# Patient Record
Sex: FEMALE | Race: BLACK OR AFRICAN AMERICAN
[De-identification: names, ages, dates, MRNs, and addresses within clinical notes are randomized per-mention and may not be internally consistent; named-entity substitution may affect disease eponyms.]

---

## 2019-06-24 NOTE — NUR
ASSESMENT COMPLETED. VSS. A/O. DENIES PAIN. SOA W/ EXERTION. NOTED GENERALIZED
EDEMA. PLANS FOR TEMPORARY DIALYSIS CATH PLACEMENT IN AM. PATINO PLACED. PT
RESTING IN BED WILL CONT. TO MONITOR. BED ALARM ON.

## 2019-06-24 NOTE — NUR
ASSESSMENT-PT WAS RECENTLY AT [facility] THEN WAS DISCHARGED TO [facility]
FOR REHAB.  PT SENT HERE TO INITIATE HEMODIALYSIS.  PT SAYS SHE WAS TOLD IN
FEB. THAT SHE NEEDED TO START HEMODIALYSIS AND PT THOUGHT SHE WOULD BE ABLE TO
TO GET BY FROM A MEDICAL STANDPOINT BUT IS FAILING.  PRIOR PT HAD BEEN LIVING
AT HOME AND HER NIECE WAS STAYING WITH HER.  PT HAS BEEEN USING A WALKER FOR
ABOUT 3 MONTHS.  NIECE HELPING HER WITH HER ADLS, MEALS, AND LAUNDRY.  PT HAD
BEEN DOING HER CLEANING.  FAMILY PROVIDES TRANSPORTATION.  PT HAS 3 SISTERS IN
THE AREA.  PT SAYS SHE HAD HH SERVICES IN FEB. BUT DOES NOT KNOW THE NAME OF
HE AGENCY THAT CAME OUT.  PT CURRENTLY SHORT OF BREATH AND HER 2 SISTERS
HELPED PROVIDE ASSESSMENT INFO.  FOLLOWING TO ASSIST WITH DC PLANNING.

## 2019-06-25 NOTE — NUR
Nutrition: screen for dx new HD pt.  Dialysis catheter placed in IR and pt
receiving HD this am, has been NPO.  No wt hx, note of swollen face in
physician assessement and expect wt up with fluid gain.  Lasix and other meds
reviewed.  K+ 5.4, Cl 108, BUN 57, Cr 5.3, Phos 7.6, albumin 1.6.  No acute
nutrition concerns noted at this time, does not appear at significant risk.
Rec renal diet; RD available via consult for edu.

## 2019-06-25 NOTE — NUR
FAXED FACE SHEET, H&P, RENAL CONSULT, DIALYSIS FLOW SHEET,CHEMISTRIES, CBC,
COAGS, UA, CXR AND LINE PLACEMENT INFO TO ELVIA GONZALEZ TO GET PT SET-UP FOR
OUTPT HEMODIALYSIS.

## 2019-06-25 NOTE — NUR
ASSESSMENT COMPLETED.LASIX ADMINISTRED AS ORDERED.PATINO CATH TO DD.PT
REPOSITIONED WHILE IN BED PER HER REQUEST.PT NPO AT THIS TIME FOR A PROCEDURE
IN THE AM.FALL PRECAUTIONS IN PLACE. CALL LIGHT WITHIN REACH.

## 2019-06-25 NOTE — NUR
RECEIVED PT CARE APPROX 0700. A/O. DENIES PAIN. SOA W/ EXERTION. NO NV.
DIALYSIS CATH PLACED IN IR TODAY. PT IN DIALYSIS AT THIS TIME- TOLERATING
WELL. WILL CONT. TO MONITOR.

## 2019-06-26 NOTE — NUR
FAXED ADDITIONAL LABS TO [facility] AND CONFIRMED THAT THEY HAVE RECEIVED THE
INFO.  UNIT MANAGER WILL CALL ME TOMORROW.  FOLLOWING.

## 2019-06-26 NOTE — NUR
FAXED REFERRAL TO [facility] FOR SKILLED STAY LEFT MSG WITH HARITHA IN ADM
THAT PT TO DC SATURDAY 6/29. DCP TO FOLLOW.

## 2019-06-26 NOTE — NUR
Assessment completed.vss.Pt up in chair since 10am till  1400.Dr Monahan and Shai
here,order noted.Pt family called and updates given.Pt left for hemodialysis.
[facility] cath will be dc whenever pt completed treatment today.Pt c/o cold room
and warm blanket given.Will continue to monitor.

## 2019-06-26 NOTE — NUR
PT'S FAMILY AT BEDSIDE AT SHIFT CHANGE.PT WAS OBSERVED SITTING UP IN THE
RECLINER IN HER ROOM WATCHING TV.PT STILL EDEMATOUS BUT BETTER THAN THE
PREVIUOS DAY.PT REPOSITIONED PER HER REQUEST WHILE IN BED.DIALYSIS SITE TO HER
R CHEST.PT DENIED PAIN SO FAR.PATINO CATH TO DD.PT PLEASANT AND COOPERATIVE
WITH CARE.PT ABLR TO MAKE HER NEEDS KNOWN.FALL PRECAUTIONS IN PLACE,CALL LIGHT
WITHIN REACH.

## 2019-06-27 NOTE — NUR
Assumed pt care at 7am.Pt in chair receiving hemodialysis before the start of
shift.Blood sugar was 42 early this am,no D50 available per pharmacy but apple
juice 240ml given. 15minutes later blood sugar was increased to 74.Dr Monahan and
Shai here,order noted.Per dialysis personnel report,3l of fluid was taken off
today.Pt will be dialyzing in am prior to dc to rehab.Will continue to
monitor.

## 2019-06-27 NOTE — NUR
ASSESSMENT COMPLETED. PT IS VERY PLEASANT. OBSERVED SITTING IN CHAIR. DENIES
PAIN. EDEMA TO BLE UP TO THE THIGHS. PT HS BLOOD SUGAR WAS 57. GIVEN SOME
APPLEJUICE AND SOME CRACKERS AND RECHECK WAS 82. DENIES SOA. IN A JOVIAL
MOOD. WITH SOME STRESS INCONTINENCE.REPORT GIVEN TO KENA NORTON AT AROUND 2300
HRS.

## 2019-06-27 NOTE — NUR
Assumed pt care at 1900. Pt A/OX4,denies pain on assessment. Up with assist of
1/rw to [facility], pt noted to have bladder incontinence x3 during NOC stating she's
not able to hold it. Pt got dialyzed yesterday 2.5L taken out and pt is to
dialyze again this morning. Has a Right chest tesio. Up resting on the chair
at this time.

## 2019-06-27 NOTE — NUR
Dc planning efforts discussed with the pt at bedside. She wants to go home
with hh and outpt dialysis vs going back to SNF at [facility]. She feels she is
getting stronger and she has the support of her niece and her sisters. She
reports her sister can take her to dialysis 3 xwkly. Pt advised that her outpt
schedule is being arranged in the  [facility] for T-TH-Sat and shift
time is 1200 noon with an arrival of 11:15am. She has had hh before and found
their services helpful. She can not recall the agency name and wants someone
who is in network with her ins.Listing declined and pt denies preference.
[facility] notified of referral as they go to [place] and accept her plan. Possible
dc tomorrow.  Pt to update her family. She has a rwalker and cane for home
use. DC planner to cancel referral with [facility].

## 2019-06-28 NOTE — NUR
DISCHARGE PAPERS GONE OVER WITH PATIENT SIGNED AND COPY IN CHART. IV ACSESS
DCD. RX'S GIVEN. ALL BELONGINGS PACKED AND SENT WITH PATIENT, SISTER TO COME
AND PICK HER UP. PT TO HAVE DIAYLSIS TOMMORROW AT 11:15

## 2019-06-28 NOTE — NUR
RECEIVED CALL FROM SHO FROM [facility] SAYING THEY WOULD PREFER PT STARTED ON TUES
INSTEAD OF SAT.  INFORMED DR NORMAN AND HE S/W WVI AND HE STILL WANTS PT TO
START ON SAT AS PLANNED.

## 2019-06-28 NOTE — NUR
[facility]ASON AND SHE
WILL NOTIFY PT TIME OF VISITS.

## 2020-08-05 NOTE — EKG
[facility]
[address]
[place]   [address]                     ELECTROCARDIOGRAM REPORT      
_______________________________________________________________________________
 
Name:       MANUEL LEON              [facility]                     [id number]                       Account #:      [id number]  
Admission:  20    Attend Phys:                          
Discharge:  20    Date of Birth:  [birth year]  
                                                          Report #: [id number]
                                                                    [id number]
_______________________________________________________________________________
THIS REPORT FOR:  
 
cc:  Melanie Romero MD, Karla L. MD Lundgren,Brody GOLDMAN MD [facility]                                        ~
THIS REPORT FOR:   //name//                          
 
                         [facility] ED
                                       
Test Date:    2020               Test Time:    16:56:41
Pat Name:     MANUEL LEON             Department:   
Patient ID:   [id number]            Room:          
Gender:       F                        Technician:   
:          1950               Requested By: Reny Starkey
Order Number: [id number] MD:   Brody Maloney
                                 Measurements
Intervals                              Axis          
Rate:         77                       P:            14
WA:           112                      QRS:          -2
QRSD:         78                       T:            13
QT:           427                                    
QTc:          484                                    
                           Interpretive Statements
Sinus rhythm
Left ventricular hypertrophy
Poor R wave progression
Cannot rule out inferior infarct, age indeterminate
Compared to ECG 2016 08:02:15
Ventricular premature complex(es) no longer present
Electronically Signed On 2020 8:31:08 CDT by Brody Maloney
[email or web address]
 
 
 
 
 
 
 
 
 
 
 
 
 
  <ELECTRONICALLY SIGNED>
   By: Brody Maloney MD, [facility]   
  20     0831
D: 20 1656                           _____________________________________
T: 20 1656                           Brody Maloney MD, [facility]     /EPI

## 2021-04-12 ENCOUNTER — HOSPITAL ENCOUNTER (OUTPATIENT)
Dept: HOSPITAL 35 - ER | Age: 71
Setting detail: OBSERVATION
LOS: 2 days | Discharge: NURSING, REHAB OR LONG-TERM CARE | End: 2021-04-14
Attending: HOSPITALIST | Admitting: HOSPITALIST
Payer: COMMERCIAL

## 2021-04-12 VITALS — DIASTOLIC BLOOD PRESSURE: 64 MMHG | SYSTOLIC BLOOD PRESSURE: 136 MMHG

## 2021-04-12 VITALS — BODY MASS INDEX: 20.9 KG/M2 | WEIGHT: 122.4 LBS | HEIGHT: 64.02 IN

## 2021-04-12 DIAGNOSIS — R74.8: ICD-10-CM

## 2021-04-12 DIAGNOSIS — I13.2: Primary | ICD-10-CM

## 2021-04-12 DIAGNOSIS — F32.9: ICD-10-CM

## 2021-04-12 DIAGNOSIS — Z79.4: ICD-10-CM

## 2021-04-12 DIAGNOSIS — I50.33: ICD-10-CM

## 2021-04-12 DIAGNOSIS — E11.65: ICD-10-CM

## 2021-04-12 DIAGNOSIS — N18.6: ICD-10-CM

## 2021-04-12 DIAGNOSIS — E78.5: ICD-10-CM

## 2021-04-12 DIAGNOSIS — I63.9: ICD-10-CM

## 2021-04-12 DIAGNOSIS — Z79.82: ICD-10-CM

## 2021-04-12 DIAGNOSIS — Z99.2: ICD-10-CM

## 2021-04-12 DIAGNOSIS — Z79.899: ICD-10-CM

## 2021-04-12 DIAGNOSIS — E11.22: ICD-10-CM

## 2021-04-12 LAB
ALBUMIN SERPL-MCNC: 2.8 G/DL (ref 3.4–5)
ALT SERPL-CCNC: 14 U/L (ref 14–59)
ANION GAP SERPL CALC-SCNC: 10 MMOL/L (ref 7–16)
AST SERPL-CCNC: 25 U/L (ref 15–37)
BASOPHILS NFR BLD AUTO: 0.8 % (ref 0–2)
BILIRUB SERPL-MCNC: 0.2 MG/DL (ref 0.2–1)
BUN SERPL-MCNC: 31 MG/DL (ref 7–18)
CALCIUM SERPL-MCNC: 8.5 MG/DL (ref 8.5–10.1)
CHLORIDE SERPL-SCNC: 97 MMOL/L (ref 98–107)
CO2 SERPL-SCNC: 30 MMOL/L (ref 21–32)
CREAT SERPL-MCNC: 5.5 MG/DL (ref 0.6–1)
EOSINOPHIL NFR BLD: 1.7 % (ref 0–3)
ERYTHROCYTE [DISTWIDTH] IN BLOOD BY AUTOMATED COUNT: 18.8 % (ref 10.5–14.5)
GLUCOSE SERPL-MCNC: 287 MG/DL (ref 74–106)
GRANULOCYTES NFR BLD MANUAL: 78.9 % (ref 36–66)
HCT VFR BLD CALC: 31.3 % (ref 37–47)
HGB BLD-MCNC: 9.9 GM/DL (ref 12–15)
LYMPHOCYTES NFR BLD AUTO: 11.4 % (ref 24–44)
MCH RBC QN AUTO: 28.2 PG (ref 26–34)
MCHC RBC AUTO-ENTMCNC: 31.7 G/DL (ref 28–37)
MCV RBC: 89.1 FL (ref 80–100)
MONOCYTES NFR BLD: 7.2 % (ref 1–8)
NEUTROPHILS # BLD: 7.1 THOU/UL (ref 1.4–8.2)
PLATELET # BLD: 60 THOU/UL (ref 150–400)
POTASSIUM SERPL-SCNC: 3.7 MMOL/L (ref 3.5–5.1)
PROT SERPL-MCNC: 7.5 G/DL (ref 6.4–8.2)
RBC # BLD AUTO: 3.51 MIL/UL (ref 4.2–5)
SODIUM SERPL-SCNC: 137 MMOL/L (ref 136–145)
WBC # BLD AUTO: 9 THOU/UL (ref 4–11)

## 2021-04-12 PROCEDURE — 32100 EXPLORATION OF CHEST: CPT

## 2021-04-13 VITALS — DIASTOLIC BLOOD PRESSURE: 79 MMHG | SYSTOLIC BLOOD PRESSURE: 158 MMHG

## 2021-04-13 VITALS — SYSTOLIC BLOOD PRESSURE: 158 MMHG | DIASTOLIC BLOOD PRESSURE: 75 MMHG

## 2021-04-13 VITALS — DIASTOLIC BLOOD PRESSURE: 98 MMHG | SYSTOLIC BLOOD PRESSURE: 168 MMHG

## 2021-04-13 VITALS — DIASTOLIC BLOOD PRESSURE: 94 MMHG | SYSTOLIC BLOOD PRESSURE: 226 MMHG

## 2021-04-13 VITALS — DIASTOLIC BLOOD PRESSURE: 68 MMHG | SYSTOLIC BLOOD PRESSURE: 161 MMHG

## 2021-04-13 VITALS — SYSTOLIC BLOOD PRESSURE: 163 MMHG | DIASTOLIC BLOOD PRESSURE: 87 MMHG

## 2021-04-13 VITALS — SYSTOLIC BLOOD PRESSURE: 148 MMHG | DIASTOLIC BLOOD PRESSURE: 67 MMHG

## 2021-04-13 VITALS — DIASTOLIC BLOOD PRESSURE: 65 MMHG | SYSTOLIC BLOOD PRESSURE: 151 MMHG

## 2021-04-13 VITALS — DIASTOLIC BLOOD PRESSURE: 67 MMHG | SYSTOLIC BLOOD PRESSURE: 148 MMHG

## 2021-04-13 VITALS — SYSTOLIC BLOOD PRESSURE: 174 MMHG | DIASTOLIC BLOOD PRESSURE: 91 MMHG

## 2021-04-13 VITALS — SYSTOLIC BLOOD PRESSURE: 159 MMHG | DIASTOLIC BLOOD PRESSURE: 77 MMHG

## 2021-04-13 VITALS — SYSTOLIC BLOOD PRESSURE: 163 MMHG | DIASTOLIC BLOOD PRESSURE: 78 MMHG

## 2021-04-13 LAB
CHOLEST SERPL-MCNC: 143 MG/DL (ref ?–200)
EST. AVERAGE GLUCOSE BLD GHB EST-MCNC: 108 MG/DL
GLYCOHEMOGLOBIN (HGB A1C): 5.4 % (ref 4.8–5.6)
HDLC SERPL-MCNC: 61 MG/DL (ref 40–?)
LDLC SERPL-MCNC: 68 MG/DL (ref ?–100)
TC:HDL: 2.3 RATIO
TRIGL SERPL-MCNC: 73 MG/DL (ref ?–150)
VLDLC SERPL CALC-MCNC: 15 MG/DL (ref ?–40)

## 2021-04-13 NOTE — NUR
RECEIVED REPORT FROM GLORIA PYLE RN.PT ARRIVED TO ROOM 206 AROUND 0645.PT A/O X
4.WEAK.BP ELEVATED.MONITOR SHOW SR.REPORT GIVEN TO DAY SHIFT RN.

## 2021-04-13 NOTE — 2DMMODE
[facility]
[address]
[place]   [address]                   2 D/M-MODE ECHOCARDIOGRAM     
_______________________________________________________________________________
 
Name:       MANUEL LEON              Room #:         206-P       [facility] 
M.R.#:      [id number]                       Account #:      [id number]  
Admission:  21    Attend Phys:    Anmol Griffith MD    
Discharge:              Date of Birth:  [birth year]  
                                                          Report #: [id number]
                                                                    [id number]
_______________________________________________________________________________
THIS REPORT FOR:  
 
cc:  Ethan Boyce MD, Srinath MD Park, Jin S. MD                                                   
                                                                       ~
 
--------------- APPROVED REPORT --------------
 
 
Study performed:  2021 08:36:43
 
EXAM: Comprehensive 2D, Doppler, and color-flow 
Echocardiogram 
Patient Location: Bedside   
Room #:  206     Status:  routine
 
      BSA:         1.83
HR: 79 bpm BP:          158/79 mmHg 
Rhythm: NSR     
 
Other Information 
Study Quality: Excellent
 
Indications
Elevated troponin.
Hx: CVA, ESRD, HTN, HLP, DM.
 
2D Dimensions
RVDd:  35.00 mm   
IVSd:  12.00 (7-11mm)  LVOT Diam:  20.00 (18-24mm) 
LVDd:  50.00 mm   
PWd:  12.00 (7-11mm)  Ascending Ao:  34.00 (22-36mm)
LVDs:  35.00 (25-40mm)  
Left Atrium:  39.00 (27-40mm) 
Aortic Root:  34.00 mm  
 
Volumes
Left Atrial Volume (Systole) 
Single Plane 4CH:  71.15 mL Single Plane 2CH:  63.39 mL
    LA ESV Index:  39.00 mL/m2
 
Aortic Valve
AoV Peak Jacob.:  1.51 m/s 
AO Peak Gr.:  9.08 mmHg  LVOT Max P.39 mmHg
    LVOT Max V:  1.16 m/s
 
 
[facility]
[address]
[place]  [address]
Phone:  [phone]                    2 D/M-MODE ECHOCARDIOGRAM     
_______________________________________________________________________________
 
Name:            MANUEL LEON              Room #:        206-P       [facility] [place]
[facility]#:           [id number]          Account #:     [id number]  
Admission:       21         Attend Phys:   Anmol Griffith MD
Discharge:                  Date of Birth: [birth year]  
                         Report #:      [id number]
        [id number]
_______________________________________________________________________________
DUDLEY Vmax: 2.42 cm2  
 
Mitral Valve
    E/A Ratio:  0.7
    MV Decel. Time:  152.93 ms
MV E Max Jacob.:  0.75 m/s 
MV A Jacob.:  1.07 m/s  
MV PHT:  44.35 ms  
IVRT:  83.04 ms   
 
Pulmonary Valve
PV Peak Jacob.:  1.01 m/s PV Peak Gr.:  4.06 mmHg
 
Tricuspid Valve
TR Peak Jacob.:  2.70 m/s  RAP Estimate:  5.00 mmHg
TR Peak Gr.:  30.00 mmHg 
    PA Pressure:  35.00 mmHg
 
Left Ventricle
The left ventricle is normal size. There is normal LV segmental wall 
motion. Mild concentric left ventricular hypertrophy. Left 
ventricular systolic function is normal. LVEF is 55-60%. Mild 
diastolic dysfunction is present (impaired relaxation 
pattern).
 
Right Ventricle
The right ventricle is normal size. The right ventricular systolic 
function is normal.
 
Atria
Left atrium is mildly dilated. The right atrium size is 
normal.
 
Aortic Valve
The aortic valve is normal in structure. Trace aortic regurgitation. 
There is no aortic valvular stenosis.
 
Mitral Valve
The mitral valve is normal in structure. Mild mitral annular 
calcification. Mild to moderate mitral regurgitation. No evidence of 
mitral valve stenosis.
 
Tricuspid Valve
The tricuspid valve is normal in structure. Trace tricuspid 
regurgitation. Estimated PAP is 35mmHg.
 
 
 
[facility]
[address]
[place]  [address]
Phone:  [phone]                    2 D/M-MODE ECHOCARDIOGRAM     
_______________________________________________________________________________
 
Name:            MANUEL LEON MARY              Room #:        206-P       [facility] [place]
M.R.#:           [id number]          Account #:     [id number]  
Admission:       21         Attend Phys:   Anmol Griffith MD
Discharge:                  Date of Birth: [birth year]  
                         Report #:      [id number]
        [id number]
_______________________________________________________________________________
Pulmonic Valve
The pulmonary valve is normal in structure. Trace pulmonic 
regurgitation.
 
Great Vessels
The aortic root is normal in size. The ascending aorta is normal in 
size. IVC is normal in size and collapses >50% with 
inspiration.
 
Pericardium
There is no pericardial effusion.
 
<Conclusion>
The left ventricle is normal size.
Mild concentric left ventricular hypertrophy.
Left ventricular systolic function is normal.
Mild diastolic dysfunction is present (impaired relaxation 
pattern).
The right ventricle is normal size.
Left atrium is mildly dilated.
Trace aortic regurgitation.
Mild to moderate mitral regurgitation.
Trace tricuspid regurgitation. Estimated PAP is 35mmHg.
 
 
 
 
 
 
 
 
 
 
 
 
 
 
 
 
 
 
 
 
 
  <ELECTRONICALLY SIGNED>
   By: Dylan Bourne MD               
  21
D: 21                           _____________________________________
T: 21                           Dylan Bourne MD                 /INF

## 2021-04-13 NOTE — NUR
Patient admits from [facility] with weakness and elevated
troponin. Met with patient reviewed role of casemgt. Plan return to [place]
once stable. Sp with RN at [place]. Patient is on skilled rehab unit but
past week not working with therapy. Plan returm, updated facility. Casemgt
following.

## 2021-04-13 NOTE — NUR
RECEIVED PT'S CARE AROUND 0735; PT. ALERT; PER REPORT PT'S BP AT 0630 ON THE
200s; TAKEN OVER RLE; PER NIGHT RN NOT MEDICATION GIVEN; BP RE-ASSESSED; SBP
ON THE 150s; 0600, 0700 AND 0900 MEDICATION GIVEN; ISOSORBIDE DINITRATE NOT
GIVEN; NOT MEDICATION AVAILABLE; GABRIEL NP AT THE BED SIDE DURING AM
ASSESSMENT; PT. AOX4; ST. HAVING PAIN OVER R. SHOULDER IF BP TAKING OVER UPPER
ARM; EDUCATED ABOUT THE IMPORTANCE OF TAKING BP OVER THE FOREARM TO HAVE
ACCURATE BP; ST. UNDERSTANDING; EDUCATED ABOUT FALL PRECAUTIONS; ST.
UNDERSTANDING; PER GABRIEL NP KEEP PT. NPO FOR THE REST OF THE DAY AFTER
BREAKFAST; NUCLEAR TECH NOTIFIED ABOUT ISOSORBIDE HOLD; PT. NOTIFIED ABOUT
TEST; ST. UNDERSTANDING; GONE FOR PROCEDURE AFTER NOON; BACK AROUND 1500;
REQUESTED "SOMETHING TO EAT"; LUNCH BOX PROVIDED; SBP ON THE 160s; SCHEDULED
MEDICATION GIVEN; ORTHOSTATIC BP TAKEN; CHECK CHARTING; C/O PAIN; PRN
ACETAMINOPHEN GIVEN BEFORE TEST; C/O PAIN AFTER TEST; PHYSICIAN NOTIFIED;
ORDERS RECEIVED; RE-ASSESSMENT PT. ST. "MEDICATION WORKED REALLY GOOD";
ASSESSMENT AS CHARGED; FOLLOWING POC; WILL PASS ON REPORT;

## 2021-04-13 NOTE — EKG
[address]
[place]  [address]
Phone:  [phone]                    ELECTROCARDIOGRAM REPORT      
_______________________________________________________________________________
 
Name:       MANUEL LEON              Room #:         206-P       [facility] 
M.R.#:      [id number]     Account #:      [id number]  
Admission:  21    Attend Phys:    Sean Villa
Discharge:              Date of Birth:  [birth year]  
                                                          Report #: [id number]
   [id number]
_______________________________________________________________________________
                         [facility] ED
                                       
Test Date:    2021               Test Time:    00:39:58
Pat Name:     MANUEL LEON             Department:   
Patient ID:   [id number]            Room:         206
Gender:       F                        Technician:   em mcguire
:          1950               Requested By: Linda Montalvo
Order Number: [id number] MD:   Evaristo Singer
                                 Measurements
Intervals                              Axis          
Rate:         78                       P:            54
OR:           122                      QRS:          0
QRSD:         95                       T:            44
QT:           476                                    
QTc:          543                                    
                           Interpretive Statements
Sinus rhythm
Probable left atrial enlargement
Anteroseptal infarct, age indeterminate
Prolonged QT interval
Baseline wander in lead(s) II,III,aVF
Compared to ECG 2020 16:56:41
Prolonged QT interval now present
Left ventricular hypertrophy no longer present
Poor R-wave progression no longer present
Myocardial infarct finding still present
Electronically Signed On 2021 6:59:45 CDT by Evaristo Singer
[email or web address]
 
 
 
 
 
 
 
 
 
 
 
 
 
 
 
  <ELECTRONICALLY SIGNED>
   By: Evaristo Singer MD, FACC    
  21     0659
D: 21 0039                           _____________________________________
T: 21 0039                           Evaristo Singer MD, FAC      /EPI

## 2021-04-14 VITALS — DIASTOLIC BLOOD PRESSURE: 95 MMHG | SYSTOLIC BLOOD PRESSURE: 125 MMHG

## 2021-04-14 VITALS — SYSTOLIC BLOOD PRESSURE: 167 MMHG | DIASTOLIC BLOOD PRESSURE: 80 MMHG

## 2021-04-14 VITALS — SYSTOLIC BLOOD PRESSURE: 169 MMHG | DIASTOLIC BLOOD PRESSURE: 86 MMHG

## 2021-04-14 LAB
ANION GAP SERPL CALC-SCNC: 8 MMOL/L (ref 7–16)
BUN SERPL-MCNC: 45 MG/DL (ref 7–18)
CALCIUM SERPL-MCNC: 8.4 MG/DL (ref 8.5–10.1)
CHLORIDE SERPL-SCNC: 101 MMOL/L (ref 98–107)
CO2 SERPL-SCNC: 31 MMOL/L (ref 21–32)
CREAT SERPL-MCNC: 7.4 MG/DL (ref 0.6–1)
ERYTHROCYTE [DISTWIDTH] IN BLOOD BY AUTOMATED COUNT: 18.8 % (ref 10.5–14.5)
GLUCOSE SERPL-MCNC: 94 MG/DL (ref 74–106)
HCT VFR BLD CALC: 29.2 % (ref 37–47)
HGB BLD-MCNC: 9.1 GM/DL (ref 12–15)
MCH RBC QN AUTO: 27.5 PG (ref 26–34)
MCHC RBC AUTO-ENTMCNC: 31.1 G/DL (ref 28–37)
MCV RBC: 88.5 FL (ref 80–100)
PLATELET # BLD: 240 THOU/UL (ref 150–400)
POTASSIUM SERPL-SCNC: 4.1 MMOL/L (ref 3.5–5.1)
RBC # BLD AUTO: 3.29 MIL/UL (ref 4.2–5)
SODIUM SERPL-SCNC: 140 MMOL/L (ref 136–145)
TROPONIN I SERPL-MCNC: 0.08 NG/ML (ref ?–0.06)
WBC # BLD AUTO: 11.4 THOU/UL (ref 4–11)

## 2021-04-14 NOTE — NUR
PATIENT DISCHARGED BACK TO [facility]. TRANSPORTED BY WHEELCHAIR [facility]AN
AT 1400. CLOTHING AND INFORMATION PACKET SENT WITH PATIENT. REPORT CALLED TO
NURSE AT [facility].

## 2021-04-14 NOTE — NUR
DENIES PAIN.UNABLE TO GO TO SLEEP AND STILL WATCHING TV.POSSIBLE DIALYSIS
TOMORROW.MONITOR SHOWS SR,ST.POC CONTINUED.

## 2021-04-14 NOTE — NUR
PT DISCHARGING TODAY BACK TO [facility] LT FAXED DC
ORDERSS/SUMMARY TO FACILITY SPOKE WITH SCOTT IN ADM SHE RECEIVED ORDERS AND
ARRABGED TRANSPORT BY  VAN FOR [phone] BUT HAD TO LET SCOTT KNOW TO SET
TRANSPORT BACK TO 1400. UNIT NOTIFIED AND CHART COPY PER US. RN TO CALL REPORT
[phone].

## 2021-04-18 NOTE — HC
[facility]
[address]
[place]   [address]                     CONSULTATION                  
_______________________________________________________________________________
 
Name:       GIOVANNI LEON              Room #:         206-P       [facility] Eduardo BARRON#:      [id number]                       Account #:      [id number]  
Admission:  04/13/21    Attend Phys:    Anmol Griffith MD    
Discharge:  04/14/21    Date of Birth:  [birth year]  
                                                          Report #: [id number]
                                                                    [id number]   
_______________________________________________________________________________
THIS REPORT FOR:  
 
cc:  Ethan Boyce MD, Srinath MD Al-Absi,Christiano RM MD                                          ~
 
DATE OF SERVICE:  04/13/2021
 
 
REASON FOR CONSULTATION:  End-stage renal disease. 
 
REASON FOR PRESENTATION:  Told that she has high blood sugar. 
 
HISTORY OF PRESENT ILLNESS:  The patient is in end-stage renal disease,
maintained on hemodialysis every Monday, Wednesday and Friday.  She was
initially thinking that she has some issues with high blood sugar; however, she
also reported that she was found to be hypotensive and she went completely out. 
The patient's blood pressure in her nursing facility was reported to be 70/40. 
The patient does have a tendency to have labile blood pressure.  She was
admitted to further evaluate.  I was consulted to manage her end-stage renal
disease. 
 
PAST MEDICAL HISTORY: 
1.  End-stage renal disease, maintained on hemodialysis. 
2.  Diabetes mellitus. 
3.  Hypertension. 
4.  Hyperlipidemia. 
5.  Remote history of CVA. 
 
SOCIAL HISTORY:  She resides in a nursing facility. 
 
PAST SURGICAL HISTORY: 
1.  Cataract. 
2.  AV fistula. 
 
MEDICATIONS: 
1.  Aspirin. 
2.  Atorvastatin. 
3.  Folic acid. 
4.  Carvedilol. 
5.  Hydralazine. 
6.  Losartan. 
 
ALLERGIES:  None. 
 
FAMILY HISTORY:  Hypertension. 
 
 
 
[facility]
[address]
[place]   [address]                     CONSULTATION                  
_______________________________________________________________________________
 
Name:       GIOVANNI LEON              Room #:         206-P       [facility] Eduardo BARRON#:      [id number]                       Account #:      [id number]  
Admission:  04/13/21    Attend Phys:    Anmol Griffith MD    
Discharge:  04/14/21    Date of Birth:  [birth year]  
                                                          Report #: [id number]
                                                                    [id number]   
_______________________________________________________________________________
 
 
REVIEW OF SYSTEMS: 
GENERAL:  No fever or chills. 
CARDIOVASCULAR:  No chest pain or palpitation. 
PULMONARY:  No cough or hemoptysis. 
NEUROLOGICAL:  Questionable history of syncope. 
GASTROINTESTINAL:  No nausea or vomiting. 
 
PHYSICAL EXAMINATION: 
GENERAL:  Alert, awake, oriented. 
VITAL SIGNS:  Blood pressure is 148/67. 
HEAD AND NECK:  No jugular venous distention, no bruit, no thyromegaly. 
CHEST:  No crackles. 
CARDIOVASCULAR:  No rub detected. 
ABDOMEN:  Soft, nontender. 
EXTREMITIES:  Lower extremities, no edema. 
 
LABORATORY DATA:  Sodium 137, potassium 3.7, BUN is 31, creatinine is 5.5. 
Hemoglobin is 9.9, platelet is 60,000. 
 
ASSESSMENT, IMPRESSION AND PLAN: 
1.  End-stage renal disease. 
2.  Hypertension. 
3.  Potential syncope. 
4.  Continue with the usual hemodialysis every Monday, Wednesday and Friday. 
5.  Resume blood pressure medication. 
6.  I will talk with her dialysis unit to discuss her dialysis session yesterday
and decide if the incident was by any means related to her dialysis.
 
 
 
 
 
 
 
 
 
 
 
 
 
 
 
  <ELECTRONICALLY SIGNED>
   By: Christiano Demarco MD          
  04/18/21 2128
D: 04/13/21 0651                           _____________________________________
T: 04/13/21 0712                           Christiano Demarco MD            /nt

## 2021-04-21 ENCOUNTER — HOSPITAL ENCOUNTER (EMERGENCY)
Dept: HOSPITAL 35 - ER | Age: 71
Discharge: NURSING, REHAB OR LONG-TERM CARE | End: 2021-04-21
Payer: COMMERCIAL

## 2021-04-21 VITALS — WEIGHT: 127.01 LBS | HEIGHT: 62 IN | BODY MASS INDEX: 23.37 KG/M2

## 2021-04-21 VITALS — SYSTOLIC BLOOD PRESSURE: 176 MMHG | DIASTOLIC BLOOD PRESSURE: 76 MMHG

## 2021-04-21 DIAGNOSIS — I12.0: Primary | ICD-10-CM

## 2021-04-21 DIAGNOSIS — Z79.82: ICD-10-CM

## 2021-04-21 DIAGNOSIS — E11.22: ICD-10-CM

## 2021-04-21 DIAGNOSIS — Z99.2: ICD-10-CM

## 2021-04-21 DIAGNOSIS — Z79.4: ICD-10-CM

## 2021-04-21 DIAGNOSIS — Z79.899: ICD-10-CM

## 2021-04-21 DIAGNOSIS — Z86.73: ICD-10-CM

## 2021-04-21 DIAGNOSIS — N18.6: ICD-10-CM

## 2021-04-21 LAB
ALBUMIN SERPL-MCNC: 2.8 G/DL (ref 3.4–5)
ALT SERPL-CCNC: 20 U/L (ref 14–59)
ANION GAP SERPL CALC-SCNC: 8 MMOL/L (ref 7–16)
ANISOCYTOSIS BLD QL SMEAR: (no result)
AST SERPL-CCNC: 26 U/L (ref 15–37)
BASOPHILS NFR BLD AUTO: 1.5 % (ref 0–2)
BILIRUB SERPL-MCNC: 0.4 MG/DL (ref 0.2–1)
BUN SERPL-MCNC: 38 MG/DL (ref 7–18)
CALCIUM SERPL-MCNC: 8.3 MG/DL (ref 8.5–10.1)
CHLORIDE SERPL-SCNC: 101 MMOL/L (ref 98–107)
CO2 SERPL-SCNC: 30 MMOL/L (ref 21–32)
CREAT SERPL-MCNC: 6.2 MG/DL (ref 0.6–1)
EOSINOPHIL NFR BLD: 1.4 % (ref 0–3)
ERYTHROCYTE [DISTWIDTH] IN BLOOD BY AUTOMATED COUNT: 18.1 % (ref 10.5–14.5)
GLUCOSE SERPL-MCNC: 127 MG/DL (ref 74–106)
GRANULOCYTES NFR BLD MANUAL: 76.4 % (ref 36–66)
HCT VFR BLD CALC: 30.9 % (ref 37–47)
HGB BLD-MCNC: 9.6 GM/DL (ref 12–15)
INR PPP: 1.11
LYMPHOCYTES NFR BLD AUTO: 11.5 % (ref 24–44)
MCH RBC QN AUTO: 27.5 PG (ref 26–34)
MCHC RBC AUTO-ENTMCNC: 31.1 G/DL (ref 28–37)
MCV RBC: 88.4 FL (ref 80–100)
MONOCYTES NFR BLD: 9.2 % (ref 1–8)
NEUTROPHILS # BLD: 7.3 THOU/UL (ref 1.4–8.2)
PLATELET # BLD: 223 THOU/UL (ref 150–400)
POTASSIUM SERPL-SCNC: 4.2 MMOL/L (ref 3.5–5.1)
PROT SERPL-MCNC: 7.3 G/DL (ref 6.4–8.2)
PROTHROMBIN TIME: 12 SECONDS (ref 9.3–11.4)
RBC # BLD AUTO: 3.5 MIL/UL (ref 4.2–5)
SODIUM SERPL-SCNC: 139 MMOL/L (ref 136–145)
WBC # BLD AUTO: 9.6 THOU/UL (ref 4–11)

## 2021-05-03 ENCOUNTER — HOSPITAL ENCOUNTER (EMERGENCY)
Dept: HOSPITAL 35 - ER | Age: 71
Discharge: NURSING, REHAB OR LONG-TERM CARE | End: 2021-05-03
Payer: COMMERCIAL

## 2021-05-03 VITALS — BODY MASS INDEX: 23.19 KG/M2 | HEIGHT: 62 IN | WEIGHT: 125.99 LBS

## 2021-05-03 VITALS — SYSTOLIC BLOOD PRESSURE: 138 MMHG | DIASTOLIC BLOOD PRESSURE: 63 MMHG

## 2021-05-03 DIAGNOSIS — I12.0: ICD-10-CM

## 2021-05-03 DIAGNOSIS — R53.1: Primary | ICD-10-CM

## 2021-05-03 DIAGNOSIS — E78.5: ICD-10-CM

## 2021-05-03 DIAGNOSIS — Z79.4: ICD-10-CM

## 2021-05-03 DIAGNOSIS — N18.6: ICD-10-CM

## 2021-05-03 DIAGNOSIS — E11.22: ICD-10-CM

## 2021-05-03 DIAGNOSIS — Z79.82: ICD-10-CM

## 2021-05-03 DIAGNOSIS — Z79.899: ICD-10-CM

## 2021-05-03 DIAGNOSIS — Z99.2: ICD-10-CM

## 2021-09-19 ENCOUNTER — HOSPITAL ENCOUNTER (EMERGENCY)
Dept: HOSPITAL 35 - ER | Age: 71
Discharge: NURSING, REHAB OR LONG-TERM CARE | End: 2021-09-19
Payer: COMMERCIAL

## 2021-09-19 VITALS — SYSTOLIC BLOOD PRESSURE: 170 MMHG | DIASTOLIC BLOOD PRESSURE: 60 MMHG

## 2021-09-19 VITALS — HEIGHT: 62 IN | WEIGHT: 127.01 LBS | BODY MASS INDEX: 23.37 KG/M2

## 2021-09-19 DIAGNOSIS — Z79.4: ICD-10-CM

## 2021-09-19 DIAGNOSIS — N18.6: ICD-10-CM

## 2021-09-19 DIAGNOSIS — I12.0: Primary | ICD-10-CM

## 2021-09-19 DIAGNOSIS — R55: ICD-10-CM

## 2021-09-19 DIAGNOSIS — E78.5: ICD-10-CM

## 2021-09-19 DIAGNOSIS — E11.22: ICD-10-CM

## 2021-09-19 DIAGNOSIS — F32.9: ICD-10-CM

## 2021-09-19 DIAGNOSIS — Z79.899: ICD-10-CM

## 2021-09-19 LAB
ALBUMIN SERPL-MCNC: 2.5 G/DL (ref 3.4–5)
ALT SERPL-CCNC: 10 U/L (ref 14–59)
ANION GAP SERPL CALC-SCNC: 13 MMOL/L (ref 7–16)
AST SERPL-CCNC: 38 U/L (ref 15–37)
BASOPHILS NFR BLD AUTO: 0.9 % (ref 0–2)
BILIRUB SERPL-MCNC: 0.4 MG/DL (ref 0.2–1)
BUN SERPL-MCNC: 52 MG/DL (ref 7–18)
CALCIUM SERPL-MCNC: 8.1 MG/DL (ref 8.5–10.1)
CHLORIDE SERPL-SCNC: 101 MMOL/L (ref 98–107)
CO2 SERPL-SCNC: 25 MMOL/L (ref 21–32)
CREAT SERPL-MCNC: 7.6 MG/DL (ref 0.6–1)
EOSINOPHIL NFR BLD: 2.8 % (ref 0–3)
ERYTHROCYTE [DISTWIDTH] IN BLOOD BY AUTOMATED COUNT: 18 % (ref 10.5–14.5)
GLUCOSE SERPL-MCNC: 119 MG/DL (ref 74–106)
GRANULOCYTES NFR BLD MANUAL: 73.8 % (ref 36–66)
HCT VFR BLD CALC: 28.8 % (ref 37–47)
HGB BLD-MCNC: 9 GM/DL (ref 12–15)
LYMPHOCYTES NFR BLD AUTO: 12.8 % (ref 24–44)
MCH RBC QN AUTO: 26.8 PG (ref 26–34)
MCHC RBC AUTO-ENTMCNC: 31.4 G/DL (ref 28–37)
MCV RBC: 85.5 FL (ref 80–100)
MONOCYTES NFR BLD: 9.7 % (ref 1–8)
NEUTROPHILS # BLD: 8.1 THOU/UL (ref 1.4–8.2)
PLATELET # BLD: 248 THOU/UL (ref 150–400)
POTASSIUM SERPL-SCNC: 5 MMOL/L (ref 3.5–5.1)
PROT SERPL-MCNC: 6.9 G/DL (ref 6.4–8.2)
RBC # BLD AUTO: 3.36 MIL/UL (ref 4.2–5)
SODIUM SERPL-SCNC: 139 MMOL/L (ref 136–145)
WBC # BLD AUTO: 10.9 THOU/UL (ref 4–11)

## 2021-09-19 NOTE — EMS
[address]   [address]                     EMS Patient Care Report       
_______________________________________________________________________________
 
Name:       GIOVANNI LEON              Room #:                     DEP FANY BARRON#:      [id number]                       Account #:      [id number]  
Admission:  21    Attend Phys:                          
Discharge:  21    Date of Birth:  [birth year]  
                                                          Report #: [id number]
                                                                    [id number]
_______________________________________________________________________________
THIS REPORT FOR:   //name//                          
 
Report Transmitted: 2021 12:00
EMS Care Summary
[place], [place]/[facility]
Incident [id number] @ 2021 12:47
 
Incident Location
[id number] [place] [address] [address]
 
Patient
GIOVANNI LEON
Female, 70 Years
 1950
 
Patient Address
[address] [place] [address]
[address]
[place] [address]
 
Patient History
Cardiac Arrythmia,Diabetes,Hypertension (HTN),Kidney/Renal Failure,Cardiac 
Condition - Other,Anemia, 
 
Patient Allergies
No known allergies,
 
Patient Medications
Carvedilol, Isosorbide, Humalog, Aspirin, Lidocaine, Glucagon, Hydralazine, 
Renvela, Losartan, Atorvastatin, Hydroxyzine, Senna, Tramadol, 
 
Chief Complaint
SYNCOPAL EPISODE
 
Disposition
Transported No Lights/[facility]
 
Dispatch Reason
Unconscious/Fainting
 
Transported To
[facility]
 
Narrative
SCENE: ON ARRIVAL PT FOUND LYING IN BED IN ROOM AT ADDRESS PROVIDED. PT IS 
 
 
 
[facility]
[address] [place]   [address]                     EMS Patient Care Report       
_______________________________________________________________________________
 
Name:       GIOVANNI LEON              Room #:                     DEP ER  
ANDERSON#:      [id number]                       Account #:      [id number]  
Admission:  21    Attend Phys:                          
Discharge:  21    Date of Birth:  [birth year]  
                                                          Report #: [id number]
                                                                    [id number]
_______________________________________________________________________________
AWAKE AND ALERT WITH A GCS OF 15. STAFF ON SCENE REPORTS PT WAS STANDING 
UPRIGHT, ATTEMPTING TO WALK TO THE BATHROOM, WHEN A TECH WITNESSED HER HAVING 
AN ACTIVE BOWEL MOVEMENT AND EXPERIENCE A NEAR SYNCOPAL EPISODE. TECH WAS ABLE 
TO GET TO PT BEFORE PT COLLAPSED. STAFF REPORTS PT HAS A KNOWN HISTORY OF 
SYNCOPE. PT CURRENTLY DENIES ALL COMPLAINTS. STAFF STATES PT NEEDS TO BE 
CHECKED OUT. PT AGREES. PT SLID TO EMS STRETCHER. 
 
AMBULANCE: VITALS MONITORED. NO CHANGES.
 
Initial Vitals
@13:04P: 80,R: 16,BP: 142/79,Pain: 0/10,GCS: 15,SpO2: 94,Revised Trauma: 12,
@13:07P: 98,R: 16,BP: 144/80,Pain: 0/10,GCS: 15,Glucose: 174,Revised Trauma: 12,
 
Assessments
@12:55MENTAL:No Abnormalities,SKIN:No Abnormalities,HEENT:Head/Face: No 
Abnormalities,Eyes: No Abnormalities,Neck/Airway: No Abnormalities,LUNG 
SOUNDS:General: No Abnormalities,Left Upper: No Abnormalities,Right Upper: No 
Abnormalities,Left Lower: No Abnormalities,Right Lower: No 
Abnormalities,ABDOMEN:General: No Abnormalities,Left Upper: No 
Abnormalities,Right Upper: No Abnormalities,Left Lower: No Abnormalities,Right 
Lower: No Abnormalities,PELVIS//GI:No Abnormalities,EXTREMITIES:Left Arm: No 
Abnormalities,Right Arm: No Abnormalities,Left Leg: No Abnormalities,Right Leg: 
No Abnormalities,PULSE:NEURO:No Abnormalities,@13:10MENTAL:No 
Abnormalities,SKIN:No Abnormalities,HEENT:Head/Face: No Abnormalities,Eyes: No 
Abnormalities,Neck/Airway: No Abnormalities,LUNG SOUNDS:General: No 
Abnormalities,Left Upper: No Abnormalities,Right Upper: No Abnormalities,Left 
Lower: No Abnormalities,Right Lower: No Abnormalities,ABDOMEN:General: No 
Abnormalities,Left Upper: No Abnormalities,Right Upper: No Abnormalities,Left 
Lower: No Abnormalities,Right Lower: No Abnormalities,PELVIS//GI:No 
Abnormalities,EXTREMITIES:Left Arm: No Abnormalities,Right Arm: No 
Abnormalities,Left Leg: No Abnormalities,Right Leg: No 
Abnormalities,PULSE:NEURO:No Abnormalities, 
 
Impression
Syncope / Fainting
 
Procedures
@12:54ALS AssessmentResponse: UnchangedSucceeded@13:01StretcherResponse: 
Unchanged 
 
Timeline
12:45,Call Received
12:45,Dispatch Notified
12:47,Dispatched
12:47,En Route
12:51,On Scene
 
 
 
[address]   [address]                     EMS Patient Care Report       
_______________________________________________________________________________
 
Name:       GIOVANNI LEON MARY              Room #:                     DEP FANY BARRON#:      [id number]                       Account #:      [id number]  
Admission:  21    Attend Phys:                          
Discharge:  21    Date of Birth:  [birth year]  
                                                          Report #: [id number]
                                                                    [id number]
_______________________________________________________________________________
12:54,At Patient
12:54,ALS Assessment,Response: UnchangedSucceeded,
13:01,Stretcher,Response: Unchanged
13:02,Depart Scene
13:04,BP: 142/79 M,PULSE: 80,RR: 16 R,SPO2: 94 Ox,ETCO2:  ,BG: ,PAIN: 0,GCS: 15,
13:07,BP: 144/80 M,PULSE: 98,RR: 16 R,SPO2:  Ox,ETCO2:  ,B,PAIN: 0,GCS: 
15, 
13:10,At Destination
13:24,Call Closed
 
Disclaimer
v1.1     Copyright  [employer]
This EMS Care Summary contains data elements from the applicable legal record 
(which may be displayed differently). It is designed to provide pertinent 
information for the following purposes: continuity of care, clinical quality, 
and state data reporting. The complete legal record is available to ED staff 
and administrators of the receiving hospital in [email or web address]'s Patient Tracker. All data 
is provided "as is."

## 2021-09-20 NOTE — EKG
[facility]
[address]
[place]  [address]
Phone:  [phone]                    ELECTROCARDIOGRAM REPORT      
_______________________________________________________________________________
 
Name:       GIOVANNI LEON              [facility]                     [id number]     Account #:      [id number]  
Admission:  21    Attend Phys:                          
Discharge:  21    Date of Birth:  [birth year]  
                                                          Report #: [id number]
   [id number]
_______________________________________________________________________________
                         [facility] ED
                                       
Test Date:    2021               Test Time:    13:29:01
Pat Name:     GIOVANNI LEON             Department:   
Patient ID:   [id number]            Room:          
Gender:       F                        Technician:   FRIDA
:          1950               Requested By: Derick Ji
Order Number: [id number] MD:   Evaristo Singer
                                 Measurements
Intervals                              Axis          
Rate:         77                       P:            -1
FL:           122                      QRS:          -18
QRSD:         74                       T:            31
QT:           429                                    
QTc:          486                                    
                           Interpretive Statements
Sinus rhythm
Left ventricular hypertrophy
Inferior infarct, old
Compared to ECG 2021 05:07:07
Q waves no longer present
Myocardial infarct finding still present
Electronically Signed On 2021 7:35:09 CDT by Evaristo Singer
[email or web address]
 
 
 
 
 
 
 
 
 
 
 
 
 
 
 
 
 
 
 
  <ELECTRONICALLY SIGNED>
   By: Evaristo Singer MD, [facility]    
  21     0735
D: 21 1329                           _____________________________________
T: 21                           Evaristo Singer MD, FACC      /EPI